# Patient Record
Sex: FEMALE | Race: WHITE
[De-identification: names, ages, dates, MRNs, and addresses within clinical notes are randomized per-mention and may not be internally consistent; named-entity substitution may affect disease eponyms.]

---

## 2018-04-15 LAB
HCT VFR BLD CALC: 36.5 % (ref 36–45)
LYMPHOCYTES # SPEC AUTO: 1.5 K/UL (ref 0.7–4.9)
MCH RBC QN AUTO: 28.4 PG (ref 27–35)
MCV RBC: 85.7 FL (ref 80–100)
PMV BLD: 8.4 FL (ref 7.6–11.3)
RBC # BLD: 4.26 M/UL (ref 3.86–4.86)
UA COMPLETE W REFLEX CULTURE PNL UR: (no result)
UA DIPSTICK W REFLEX MICRO PNL UR: (no result)

## 2018-04-16 ENCOUNTER — HOSPITAL ENCOUNTER (INPATIENT)
Dept: HOSPITAL 97 - 2ND-WC | Age: 28
LOS: 2 days | Discharge: HOME | End: 2018-04-18
Attending: OBSTETRICS & GYNECOLOGY | Admitting: OBSTETRICS & GYNECOLOGY
Payer: COMMERCIAL

## 2018-04-16 VITALS — BODY MASS INDEX: 34.4 KG/M2

## 2018-04-16 DIAGNOSIS — Z3A.39: ICD-10-CM

## 2018-04-16 DIAGNOSIS — E87.6: ICD-10-CM

## 2018-04-16 DIAGNOSIS — Z23: ICD-10-CM

## 2018-04-16 DIAGNOSIS — E83.42: ICD-10-CM

## 2018-04-16 DIAGNOSIS — O34.211: Primary | ICD-10-CM

## 2018-04-16 DIAGNOSIS — I49.3: ICD-10-CM

## 2018-04-16 LAB
BUN BLD-MCNC: 5 MG/DL (ref 6–20)
GLUCOSE SERPLBLD-MCNC: 72 MG/DL (ref 65–120)
MAGNESIUM SERPL-MCNC: 1.2 MG/DL (ref 1.8–2.5)
POTASSIUM SERPL-SCNC: 3.3 MEQ/L (ref 3.6–5)
RPR SER QL: (no result)

## 2018-04-16 PROCEDURE — 81015 MICROSCOPIC EXAM OF URINE: CPT

## 2018-04-16 PROCEDURE — 86850 RBC ANTIBODY SCREEN: CPT

## 2018-04-16 PROCEDURE — 81003 URINALYSIS AUTO W/O SCOPE: CPT

## 2018-04-16 PROCEDURE — 85025 COMPLETE CBC W/AUTO DIFF WBC: CPT

## 2018-04-16 PROCEDURE — 93005 ELECTROCARDIOGRAM TRACING: CPT

## 2018-04-16 PROCEDURE — 86901 BLOOD TYPING SEROLOGIC RH(D): CPT

## 2018-04-16 PROCEDURE — 88305 TISSUE EXAM BY PATHOLOGIST: CPT

## 2018-04-16 PROCEDURE — 93306 TTE W/DOPPLER COMPLETE: CPT

## 2018-04-16 PROCEDURE — 87340 HEPATITIS B SURFACE AG IA: CPT

## 2018-04-16 PROCEDURE — 83735 ASSAY OF MAGNESIUM: CPT

## 2018-04-16 PROCEDURE — 86900 BLOOD TYPING SEROLOGIC ABO: CPT

## 2018-04-16 PROCEDURE — 88307 TISSUE EXAM BY PATHOLOGIST: CPT

## 2018-04-16 PROCEDURE — 90715 TDAP VACCINE 7 YRS/> IM: CPT

## 2018-04-16 PROCEDURE — 86592 SYPHILIS TEST NON-TREP QUAL: CPT

## 2018-04-16 PROCEDURE — 80048 BASIC METABOLIC PNL TOTAL CA: CPT

## 2018-04-16 PROCEDURE — 36415 COLL VENOUS BLD VENIPUNCTURE: CPT

## 2018-04-16 NOTE — P.OP
Assistant: Nichol Elise


Preoperative diagnosis: 39 week 3 days h/o prior 


Postoperative diagnosis: same


Primary procedure: Repeat low transverse  section


Secondary procedure: none


Anesthesia: Spinal


Estimated blood loss: 800 cc


Specimen: cord blood, placenta


Operative Technique: 





 The patient was taken to the operating room where her spinal anesthesia was 

placed.. She was prepped and draped in the usual fashion for the procedure. 

After adequate spinal level was confirmed, the scalpel was utilized to make a 

transverse incision in the patient's lower abdominal wall. This incision was 

carried down to the level of the fascia, which was also transversely incised. 

After adequate hemostasis, the fascia was bluntly and sharply  up from 

the underlying rectus muscle. The rectus muscle was  in midline 

exposing the peritoneum. The peritoneum was carefully grasped and elevated with 

hemostats. It was entered in an up and down fashion with Metzenbaum scissors. 

The bladder blade was placed in the lower pole of the incision to protect the 

bladder.





The uterus was palpated and inspected. A thin lower uterine segment was noted. 

The vertex presentation was confirmed. The scalpel was then utilized to make a 

transverse or Kwon incision in the lower uterine wall. Clear fluid was noted 

upon entering into the amniotic space. A Kiwi vacuum was used to delivery the 

infant due to anteflexed head. A term viable female infant was delivered up 

through the incision. She had spontaneous respirations. She was given bulb 

suctioning for clear fluid. Her cord was clamped and cut and she was delivered 

off the field to nursery nurse. The baby girl was subsequently signed Apgars of 

9 at one minute and 9 at five minutes. Her birth weight was found to be 8 

pounds and 11 ounces.





The placenta was manually extracted from the endometrial cavity. The uterus was 

delivered up into the operative field. The endometrial cavity was swiped clean 

with a moist laparotomy pad. The uterine incision was then closed in a two-

layered fashion with 0 Vicryl suture, the first layer interlocking and the 

second layer imbricating. The uterine incision was noted to be hemostatic upon 

closure. Bladder flap was then reapproximated with a 3.0 vicryl. The uterus was 

rotated forward, normal tubes were noted on both sides. The left ovary was 

noted  The uterus was then returned to its normal position of the abdominal 

cavity. The sponge and instrument count was performed for the first time at 

this point and found to be correct. A final check of the uterine incision 

confirmed hemostasis. The rectus muscle were stabilized across the midline. The 

subcutaneous tissue was then exposed, and the fascia closed with two running 

lengths of 0 Vicryl suture, beginning in lateral margins and overlapping the 

midline. The subcutaneous tissue was then irrigated and inspected. No active 

bleeding was noted. It was closed with a interrupted 2.0 plain catgut suture. 

The skin was then approximated with 3.0 vicryl on a Luan needle. 





The patient was transferred to the recovery room in stable condition. The 

estimated blood loss through the procedure was 800 mL. The sponge and 

instrument counts were performed two more times during closure and found to be 

correct each time.








Complications: None


Drain(s): Urinary catheter


Transferred to: Recovery Room


Condition: Good

## 2018-04-16 NOTE — EKG
Test Date:    2018-04-16               Test Time:    16:23:13

Technician:   ERIK                                     

                                                     

MEASUREMENT RESULTS:                                       

Intervals:                                           

Rate:         84                                     

MA:           150                                    

QRSD:         74                                     

QT:           336                                    

QTc:          397                                    

Axis:                                                

P:            48                                     

MA:           150                                    

QRS:          -11                                    

T:            31                                     

                                                     

INTERPRETIVE STATEMENTS:                                       

                                                     

Sinus rhythm with frequent premature ventricular complexes

Otherwise normal ECG

No previous ECG available for comparison



Electronically Signed On 04-16-18 16:46:50 CDT by Nacho Gonzalez

## 2018-04-16 NOTE — CON
CARDIOLOGY CONSULTATION



Reason For Consult:  PVCs.



History Of Present Illness:  Ms. Monteiro has had a healthy life.  She is  2, para 2.  Her last de
livery was today, a  section.  Her first delivery likewise was a  section.  She seeme
d to do well in every way, but while recovering, was noted to have a lot of PVCs on telemetry, also o
n an EKG.  There were no other abnormalities on the EKG.  Because of this, we decided to check electr
olytes.  Both potassium and magnesium levels were low.  The patient has a prior history of low potass
ium levels associated with her first pregnancy.  She does not have any heart disease.  No history of 
any heart surgeries.  No histories of abnormal EKGs or test done on the heart.  She felt her heart fl
ip-flop during her first pregnancy, does not feel anything now.  Denies chest pain or shortness of br
eath.



Outpatient Medications:  Have just been a prenatal vitamin.  She denies using any supplements.



Social History:  No tobacco, alcohol, or illegal drugs.



Physical Examination:

Vital Signs:  5 feet 1 inches, 182 pounds. 

HEENT:  Unremarkable. 

Lungs:  Clear. 

Cardiac exam:  Reveals a regular rate and rhythm. 

Abdomen:  Soft. 

Extremities:  Normal.  No cyanosis, clubbing, or edema.  Distal pulses normal.



Diagnostic Data:  The EKG is normal except for uniform PVCs.



Assessment And Plan:  I will recommend that we replete potassium and magnesium and try and check elec
trolytes.  Do an echocardiogram tomorrow to make sure this is not a sign of a cardiomyopathy.





RAN/KAM

DD:  2018 18:40:53Voice ID:  [id number]

DT:  2018 22:52:51Report ID:  [id number]

## 2018-04-17 LAB
BUN BLD-MCNC: 5 MG/DL (ref 6–20)
GLUCOSE SERPLBLD-MCNC: 86 MG/DL (ref 65–120)
HCT VFR BLD CALC: 31.6 % (ref 36–45)
LYMPHOCYTES # SPEC AUTO: 1.8 K/UL (ref 0.7–4.9)
MAGNESIUM SERPL-MCNC: 1.5 MG/DL (ref 1.8–2.5)
MCH RBC QN AUTO: 28.8 PG (ref 27–35)
MCV RBC: 85.7 FL (ref 80–100)
PMV BLD: 8.3 FL (ref 7.6–11.3)
POTASSIUM SERPL-SCNC: 3.4 MEQ/L (ref 3.6–5)
RBC # BLD: 3.69 M/UL (ref 3.86–4.86)

## 2018-04-17 RX ADMIN — KETOROLAC TROMETHAMINE PRN MG: 30 INJECTION, SOLUTION INTRAMUSCULAR at 23:15

## 2018-04-17 RX ADMIN — OXYCODONE AND ACETAMINOPHEN PRN TAB: 5; 325 TABLET ORAL at 00:30

## 2018-04-17 RX ADMIN — OXYCODONE AND ACETAMINOPHEN PRN TAB: 5; 325 TABLET ORAL at 16:33

## 2018-04-17 RX ADMIN — KETOROLAC TROMETHAMINE PRN MG: 30 INJECTION, SOLUTION INTRAMUSCULAR at 11:32

## 2018-04-17 NOTE — PN
The patient is seen by Dr. Gonzalez yesterday as per Dr. Newman's request for PVCs.  After , sh
joce was noted to have low magnesium and low potassium that have been supplemented.  PVCs have decreased
.  Echocardiogram will be done today.  If this is normal, we will continue her present regimen and ha
ve her recheck her labs to make sure her potassium and potassium are within normal limits.  We would 
not recommend beta blockade at this point.





NB/KAM

DD:  2018 07:28:43Voice ID:  [id number]

DT:  2018 11:38:00Report ID:  [id number]

## 2018-04-17 NOTE — ECHO
HEIGHT: 5 ft 1 in   WEIGHT: 182 lb 0 oz   DATE OF STUDY: 04/17/2018   REFER DR: Nacho Gonzalez MD

2-DIMENSIONAL: YES

     M.MODE: YES

 DOPPLER: YES

COLOR FLOW: YES



                    TDS:  NO

PORTABLE: NO

 DEFINITY:  NO

BUBBLE STUDY: NO





DIAGNOSIS:  PREMATURE VENTRICULAR COMPLEXES



CARDIAC HISTORY:  

CATHERIZATION: NO

SURGERY: NO

PROSTHETIC VALVE: NO

PACEMAKER: NO





MEASUREMENTS (cm)

    DIASTOLIC (NORMALS)                 SYSTOLIC (NORMALS)

IVSd                 1.1 (0.6-1.2)                    LA Diam 3.3 (1.9-4.0)     LVEF       
  52%  

LVIDd               4.8 (3.5-5.7)                        LVIDs      3.5 (2.0-3.5)     %FS  
        27%

LVPWd             1.0 (0.6-1.2)

Ao Diam           2.6 (2.0-3.7)



2 DIMENSIONAL ASSESSMENT:

RIGHT ATRIUM:                   NORMAL

LEFT ATRIUM:       NORMAL



RIGHT VENTRICLE:            NORMAL

LEFT VENTRICLE: NORMAL



TRICUSPID VALVE:             NORMAL

MITRAL VALVE:     NORMAL



PULMONIC VALVE:             NORMAL

AORTIC VALVE:     NORMAL



PERICARDIAL EFFUSION: NONE

AORTIC ROOT:      NORMAL





LEFT VENTRICULAR WALL MOTION:     NORMAL



DOPPLER/COLOR FLOW:     MILD TRICUSPID REGURGITATION. NORMAL RIGHT VENTRICULAR SYSTOLIC 
PRESSURE.



COMMENTS:      NORMAL 2D ECHOCARDIOGRAM. MILD TRICUSPID REGURGITATION.



TECHNOLOGIST:   JEF CAVAZOS

## 2018-04-18 VITALS — SYSTOLIC BLOOD PRESSURE: 129 MMHG | DIASTOLIC BLOOD PRESSURE: 80 MMHG | TEMPERATURE: 97.3 F

## 2018-04-18 RX ADMIN — OXYCODONE AND ACETAMINOPHEN PRN TAB: 5; 325 TABLET ORAL at 04:50

## 2018-04-25 NOTE — P.DS
Admission Date: 18


Discharge Date: 18


Disposition: ROUTINE DISCHARGE


Discharge Condition: GOOD


Consultations: 





Cardiology


Brief History of Present Illness: 





Patient presented at 39+ weeks gestation with h/o one prior  section 

scheduled for a repeat  section. 


Hospital Course: 





Patient did well during surgery. She did develop PVCs on her cardiac monitoring 

postpartum. Therefore she was seen by Cardiology. An echocardiogram was done 

and there was no evidence of an acute issue. 


Patient is recovering well. She is voiding without difficulty. She is 

tolerating a regular diet. Her pain is well controlled. She is bonding well 

with the baby. 








Vital Signs/Physical Exam: 














Temp Pulse Resp BP Pulse Ox


 


 97.3 F   86   18   129/80   0 L


 


 18 07:15  18 07:15  18 07:15  18 07:15  18 04:30








General: Alert, In no apparent distress


HEENT: Atraumatic


Neck: Supple


Respiratory: Normal air movement


Cardiovascular: No edema, Normal pulses


Gastrointestinal: Soft and benign, No ascites, Other (incision clean dry and 

intact)


Musculoskeletal: No clubbing, No swelling


Neurological: Normal gait, Normal speech


Laboratory Data at Discharge: 














WBC  13.8 K/uL (4.3-10.9)  H D 18  04:30    


 


Hgb  10.6 g/dL (12.0-15.0)  L  18  04:30    


 


Hct  31.6 % (36.0-45.0)  L  18  04:30    


 


Plt Count  214 K/uL (152-406)   18  04:30    


 


Sodium  134 mEq/L (135-145)  L  18  04:30    


 


Potassium  3.4 mEq/L (3.6-5.0)  L  18  04:30    


 


BUN  5 mg/dL (6-20)  L  18  04:30    


 


Creatinine  0.45 mg/dL (0.44-1.00)   18  04:30    


 


Glucose  86 mg/dL ()   18  04:30    


 


Magnesium  1.5 mg/dL (1.8-2.5)  L  18  04:30    








Home Medications: 








Prenatal Vitamin [Prenatal VITAMIN*] 1 tab PO DAILY 18 


Codeine/APAP [Tylenol W/Codeine #3 tab] 1 tab PO Q6HP PRN #25 tab 18 





New Medications: 


Codeine/APAP [Tylenol W/Codeine #3 tab] 1 tab PO Q6HP PRN #25 tab


 PRN Reason: Pain


Diet: Regular


Activity: No lifting more than 10 lbs


Followup: 


Rashel Newman DO [Family Provider] -  (Follow up care with Dr. Newman in 4-6 

weeks.  [phone])